# Patient Record
Sex: FEMALE | Race: OTHER | HISPANIC OR LATINO
[De-identification: names, ages, dates, MRNs, and addresses within clinical notes are randomized per-mention and may not be internally consistent; named-entity substitution may affect disease eponyms.]

---

## 2019-03-19 PROBLEM — Z00.00 ENCOUNTER FOR PREVENTIVE HEALTH EXAMINATION: Status: ACTIVE | Noted: 2019-03-19

## 2019-04-16 ENCOUNTER — APPOINTMENT (OUTPATIENT)
Dept: OTOLARYNGOLOGY | Facility: CLINIC | Age: 49
End: 2019-04-16

## 2019-05-14 ENCOUNTER — APPOINTMENT (OUTPATIENT)
Dept: OTOLARYNGOLOGY | Facility: CLINIC | Age: 49
End: 2019-05-14

## 2019-05-30 ENCOUNTER — APPOINTMENT (OUTPATIENT)
Dept: OTOLARYNGOLOGY | Facility: CLINIC | Age: 49
End: 2019-05-30

## 2023-05-02 ENCOUNTER — OFFICE (OUTPATIENT)
Facility: LOCATION | Age: 53
Setting detail: OPHTHALMOLOGY
End: 2023-05-02
Payer: COMMERCIAL

## 2023-05-02 ENCOUNTER — RX ONLY (RX ONLY)
Age: 53
End: 2023-05-02

## 2023-05-02 DIAGNOSIS — H10.45: ICD-10-CM

## 2023-05-02 PROCEDURE — 92002 INTRM OPH EXAM NEW PATIENT: CPT | Performed by: OPHTHALMOLOGY

## 2023-05-02 ASSESSMENT — SUPERFICIAL PUNCTATE KERATITIS (SPK)
OD_SPK: 3+
OS_SPK: 3+

## 2023-05-02 ASSESSMENT — TONOMETRY
OS_IOP_MMHG: 14
OD_IOP_MMHG: 14

## 2023-05-02 ASSESSMENT — VISUAL ACUITY
OS_BCVA: 20/40+2
OD_BCVA: 20/25+2

## 2023-05-02 ASSESSMENT — CONFRONTATIONAL VISUAL FIELD TEST (CVF)
OD_FINDINGS: FULL
OS_FINDINGS: FULL

## 2023-05-02 ASSESSMENT — PUNCTA - ASSESSMENT
OD_PUNCTA: RUL
OS_PUNCTA: LUL

## 2023-05-30 ENCOUNTER — RX ONLY (RX ONLY)
Age: 53
End: 2023-05-30

## 2023-05-30 ENCOUNTER — OFFICE (OUTPATIENT)
Facility: LOCATION | Age: 53
Setting detail: OPHTHALMOLOGY
End: 2023-05-30
Payer: COMMERCIAL

## 2023-05-30 DIAGNOSIS — H02.89: ICD-10-CM

## 2023-05-30 PROBLEM — H10.45 ALLERGIC CONJUNCTIVITIS ; BOTH EYES: Status: ACTIVE | Noted: 2023-05-02

## 2023-05-30 PROBLEM — H16.223 DRY EYE SYNDROME K SICCA; BOTH EYES: Status: ACTIVE | Noted: 2023-05-02

## 2023-05-30 PROCEDURE — 92012 INTRM OPH EXAM EST PATIENT: CPT | Performed by: OPHTHALMOLOGY

## 2023-05-30 ASSESSMENT — SPHEQUIV_DERIVED
OS_SPHEQUIV: -1.125
OD_SPHEQUIV: -1.5

## 2023-05-30 ASSESSMENT — REFRACTION_AUTOREFRACTION
OS_SPHERE: -0.25
OD_SPHERE: -1.25
OD_AXIS: 098
OD_CYLINDER: -0.50
OS_CYLINDER: -1.75
OS_AXIS: 052

## 2023-05-30 ASSESSMENT — LID EXAM ASSESSMENTS
OD_MEIBOMITIS: RLL 2+
OS_MEIBOMITIS: LLL 2+

## 2023-05-30 ASSESSMENT — KERATOMETRY
OD_K2POWER_DIOPTERS: 42.25
OS_K1POWER_DIOPTERS: 40.50
OS_AXISANGLE_DEGREES: 122
OS_K2POWER_DIOPTERS: 42.00
OD_AXISANGLE_DEGREES: 068
OD_K1POWER_DIOPTERS: 41.50

## 2023-05-30 ASSESSMENT — CONFRONTATIONAL VISUAL FIELD TEST (CVF)
OS_FINDINGS: FULL
OD_FINDINGS: FULL

## 2023-05-30 ASSESSMENT — VISUAL ACUITY
OS_BCVA: 20/40+2
OD_BCVA: 20/25+2

## 2023-05-30 ASSESSMENT — PUNCTA - ASSESSMENT
OS_PUNCTA: LUL
OD_PUNCTA: RUL

## 2023-05-30 ASSESSMENT — SUPERFICIAL PUNCTATE KERATITIS (SPK)
OS_SPK: 3+
OD_SPK: 3+

## 2023-05-30 ASSESSMENT — AXIALLENGTH_DERIVED
OD_AL: 24.8355
OS_AL: 24.9283

## 2023-06-20 ENCOUNTER — OFFICE (OUTPATIENT)
Facility: LOCATION | Age: 53
Setting detail: OPHTHALMOLOGY
End: 2023-06-20
Payer: COMMERCIAL

## 2023-06-20 ENCOUNTER — RX ONLY (RX ONLY)
Age: 53
End: 2023-06-20

## 2023-06-20 DIAGNOSIS — H02.89: ICD-10-CM

## 2023-06-20 PROCEDURE — 92012 INTRM OPH EXAM EST PATIENT: CPT | Performed by: OPHTHALMOLOGY

## 2023-06-20 ASSESSMENT — PUNCTA - ASSESSMENT
OD_PUNCTA: RUL
OS_PUNCTA: LUL

## 2023-06-20 ASSESSMENT — KERATOMETRY
OD_K1POWER_DIOPTERS: 41.50
OS_K2POWER_DIOPTERS: 42.00
OD_K2POWER_DIOPTERS: 42.25
OS_K1POWER_DIOPTERS: 40.50
OS_AXISANGLE_DEGREES: 122
OD_AXISANGLE_DEGREES: 068

## 2023-06-20 ASSESSMENT — REFRACTION_AUTOREFRACTION
OS_AXIS: 052
OS_SPHERE: -0.25
OD_AXIS: 098
OD_SPHERE: -1.25
OD_CYLINDER: -0.50
OS_CYLINDER: -1.75

## 2023-06-20 ASSESSMENT — VISUAL ACUITY
OS_BCVA: 20/40+2
OD_BCVA: 20/25+2

## 2023-06-20 ASSESSMENT — TONOMETRY
OS_IOP_MMHG: 10
OD_IOP_MMHG: 10

## 2023-06-20 ASSESSMENT — SUPERFICIAL PUNCTATE KERATITIS (SPK)
OD_SPK: 3+
OS_SPK: 3+

## 2023-06-20 ASSESSMENT — CONFRONTATIONAL VISUAL FIELD TEST (CVF)
OD_FINDINGS: FULL
OS_FINDINGS: FULL

## 2023-06-20 ASSESSMENT — LID EXAM ASSESSMENTS
OD_MEIBOMITIS: RLL 2+
OS_MEIBOMITIS: LLL 2+

## 2023-06-20 ASSESSMENT — SPHEQUIV_DERIVED
OD_SPHEQUIV: -1.5
OS_SPHEQUIV: -1.125

## 2023-06-20 ASSESSMENT — AXIALLENGTH_DERIVED
OD_AL: 24.8355
OS_AL: 24.9283

## 2023-08-25 ENCOUNTER — OFFICE (OUTPATIENT)
Facility: LOCATION | Age: 53
Setting detail: OPHTHALMOLOGY
End: 2023-08-25
Payer: COMMERCIAL

## 2023-08-25 DIAGNOSIS — H02.89: ICD-10-CM

## 2023-08-25 DIAGNOSIS — H52.13: ICD-10-CM

## 2023-08-25 PROBLEM — I67.1 CEREBRAL ANEURYSM,NONRUPTURED: Status: ACTIVE | Noted: 2023-08-25

## 2023-08-25 PROBLEM — H10.43 ALLERGIC CONJUNCTIVITIS ; BOTH EYES: Status: ACTIVE | Noted: 2023-08-25

## 2023-08-25 PROCEDURE — 99213 OFFICE O/P EST LOW 20 MIN: CPT | Performed by: OPHTHALMOLOGY

## 2023-08-25 PROCEDURE — 92015 DETERMINE REFRACTIVE STATE: CPT | Performed by: OPHTHALMOLOGY

## 2023-08-25 ASSESSMENT — KERATOMETRY
OS_K2POWER_DIOPTERS: 41.50
OS_AXISANGLE_DEGREES: 092
OD_AXISANGLE_DEGREES: 077
OD_K2POWER_DIOPTERS: 42.25
OD_K1POWER_DIOPTERS: 41.75
OS_K1POWER_DIOPTERS: 40.50

## 2023-08-25 ASSESSMENT — REFRACTION_AUTOREFRACTION
OD_SPHERE: -0.75
OS_AXIS: 074
OD_CYLINDER: -0.75
OD_AXIS: 089
OS_SPHERE: -0.75
OS_CYLINDER: -0.75

## 2023-08-25 ASSESSMENT — SUPERFICIAL PUNCTATE KERATITIS (SPK)
OS_SPK: 3+
OD_SPK: 3+

## 2023-08-25 ASSESSMENT — SPHEQUIV_DERIVED
OD_SPHEQUIV: -1.125
OS_SPHEQUIV: -1.375
OS_SPHEQUIV: -1.125
OD_SPHEQUIV: -1.375

## 2023-08-25 ASSESSMENT — LID EXAM ASSESSMENTS
OS_MEIBOMITIS: LLL 2+
OD_MEIBOMITIS: RLL 2+

## 2023-08-25 ASSESSMENT — AXIALLENGTH_DERIVED
OD_AL: 24.6245
OS_AL: 25.0312
OS_AL: 25.1414
OD_AL: 24.731

## 2023-08-25 ASSESSMENT — PUNCTA - ASSESSMENT
OD_PUNCTA: RUL
OS_PUNCTA: LUL

## 2023-08-25 ASSESSMENT — TONOMETRY
OD_IOP_MMHG: 10
OS_IOP_MMHG: 10

## 2023-08-25 ASSESSMENT — VISUAL ACUITY
OS_BCVA: 20/50-
OD_BCVA: 20/50

## 2023-08-25 ASSESSMENT — REFRACTION_MANIFEST
OS_CYLINDER: -0.75
OD_SPHERE: -1.00
OS_SPHERE: -1.00
OD_AXIS: 090
OD_CYLINDER: -0.75
OS_AXIS: 075

## 2023-10-02 ENCOUNTER — OFFICE (OUTPATIENT)
Facility: LOCATION | Age: 53
Setting detail: OPHTHALMOLOGY
End: 2023-10-02
Payer: COMMERCIAL

## 2023-10-02 DIAGNOSIS — H02.89: ICD-10-CM

## 2023-10-02 DIAGNOSIS — S05.02XA: ICD-10-CM

## 2023-10-02 PROCEDURE — 92012 INTRM OPH EXAM EST PATIENT: CPT | Performed by: OPHTHALMOLOGY

## 2023-10-02 ASSESSMENT — REFRACTION_MANIFEST
OS_CYLINDER: -0.75
OD_SPHERE: -1.00
OD_CYLINDER: -0.75
OD_AXIS: 090
OS_SPHERE: -1.00
OS_AXIS: 075

## 2023-10-02 ASSESSMENT — SUPERFICIAL PUNCTATE KERATITIS (SPK)
OD_SPK: 3+
OS_SPK: 3+

## 2023-10-02 ASSESSMENT — AXIALLENGTH_DERIVED
OS_AL: 25.1414
OS_AL: 25.0312
OD_AL: 24.731
OD_AL: 24.6245

## 2023-10-02 ASSESSMENT — KERATOMETRY
OS_K1POWER_DIOPTERS: 40.50
OS_AXISANGLE_DEGREES: 092
OD_K2POWER_DIOPTERS: 42.25
OS_K2POWER_DIOPTERS: 41.50
OD_AXISANGLE_DEGREES: 077
OD_K1POWER_DIOPTERS: 41.75

## 2023-10-02 ASSESSMENT — LID EXAM ASSESSMENTS
OS_EDEMA: LLL 1+
OS_MEIBOMITIS: LLL 2+
OD_MEIBOMITIS: RLL 2+

## 2023-10-02 ASSESSMENT — REFRACTION_AUTOREFRACTION
OS_SPHERE: -0.75
OS_AXIS: 074
OD_AXIS: 089
OD_CYLINDER: -0.75
OS_CYLINDER: -0.75
OD_SPHERE: -0.75

## 2023-10-02 ASSESSMENT — SPHEQUIV_DERIVED
OS_SPHEQUIV: -1.125
OD_SPHEQUIV: -1.375
OS_SPHEQUIV: -1.375
OD_SPHEQUIV: -1.125

## 2023-10-02 ASSESSMENT — PUNCTA - ASSESSMENT
OD_PUNCTA: RUL
OS_PUNCTA: LUL

## 2023-10-02 ASSESSMENT — VISUAL ACUITY: OD_BCVA: 20/25+2

## 2023-10-02 ASSESSMENT — CORNEAL TRAUMA - ABRASION: OS_ABRASION: PRESENT

## 2023-10-02 ASSESSMENT — CONFRONTATIONAL VISUAL FIELD TEST (CVF)
OS_FINDINGS: FULL
OD_FINDINGS: FULL

## 2023-10-11 ENCOUNTER — OFFICE (OUTPATIENT)
Facility: LOCATION | Age: 53
Setting detail: OPHTHALMOLOGY
End: 2023-10-11
Payer: COMMERCIAL

## 2023-10-11 DIAGNOSIS — H02.89: ICD-10-CM

## 2023-10-11 PROBLEM — S05.02XD CORNEAL ABRASION; SUBSEQUENT ENCOUNTER: Status: RESOLVED | Noted: 2023-10-11 | Resolved: 2023-10-11

## 2023-10-11 PROCEDURE — 92012 INTRM OPH EXAM EST PATIENT: CPT | Performed by: OPHTHALMOLOGY

## 2023-10-11 ASSESSMENT — PUNCTA - ASSESSMENT
OS_PUNCTA: LUL
OD_PUNCTA: RUL

## 2023-10-11 ASSESSMENT — REFRACTION_AUTOREFRACTION
OD_AXIS: 089
OS_AXIS: 074
OD_SPHERE: -0.75
OD_CYLINDER: -0.75
OS_SPHERE: -0.75
OS_CYLINDER: -0.75

## 2023-10-11 ASSESSMENT — AXIALLENGTH_DERIVED
OD_AL: 24.6245
OS_AL: 25.1414
OS_AL: 25.0312
OD_AL: 24.731

## 2023-10-11 ASSESSMENT — CONFRONTATIONAL VISUAL FIELD TEST (CVF)
OS_FINDINGS: FULL
OD_FINDINGS: FULL

## 2023-10-11 ASSESSMENT — SUPERFICIAL PUNCTATE KERATITIS (SPK)
OD_SPK: 3+
OS_SPK: 3+

## 2023-10-11 ASSESSMENT — KERATOMETRY
OD_K1POWER_DIOPTERS: 41.75
OS_K1POWER_DIOPTERS: 40.50
OS_AXISANGLE_DEGREES: 092
OD_K2POWER_DIOPTERS: 42.25
OS_K2POWER_DIOPTERS: 41.50
OD_AXISANGLE_DEGREES: 077

## 2023-10-11 ASSESSMENT — REFRACTION_MANIFEST
OS_AXIS: 075
OS_CYLINDER: -0.75
OS_SPHERE: -1.00
OD_AXIS: 090
OD_SPHERE: -1.00
OD_CYLINDER: -0.75

## 2023-10-11 ASSESSMENT — VISUAL ACUITY: OD_BCVA: 20/25+2

## 2023-10-11 ASSESSMENT — SPHEQUIV_DERIVED
OD_SPHEQUIV: -1.375
OD_SPHEQUIV: -1.125
OS_SPHEQUIV: -1.375
OS_SPHEQUIV: -1.125

## 2023-10-11 ASSESSMENT — LID EXAM ASSESSMENTS
OD_MEIBOMITIS: RLL 2+
OS_MEIBOMITIS: LLL 2+
OS_EDEMA: LLL 1+

## 2023-10-11 ASSESSMENT — CORNEAL TRAUMA - ABRASION: OS_ABRASION: ABSENT

## 2023-11-09 ENCOUNTER — APPOINTMENT (OUTPATIENT)
Dept: NEUROSURGERY | Facility: CLINIC | Age: 53
End: 2023-11-09
Payer: COMMERCIAL

## 2023-11-09 ENCOUNTER — NON-APPOINTMENT (OUTPATIENT)
Age: 53
End: 2023-11-09

## 2023-11-09 ENCOUNTER — TRANSCRIPTION ENCOUNTER (OUTPATIENT)
Age: 53
End: 2023-11-09

## 2023-11-09 VITALS — BODY MASS INDEX: 18.88 KG/M2 | WEIGHT: 100 LBS | HEIGHT: 61 IN

## 2023-11-09 DIAGNOSIS — H33.311 HORSESHOE TEAR OF RETINA W/OUT DETACHMENT, RIGHT EYE: ICD-10-CM

## 2023-11-09 DIAGNOSIS — Z86.69 PERSONAL HISTORY OF OTHER DISEASES OF THE NERVOUS SYSTEM AND SENSE ORGANS: ICD-10-CM

## 2023-11-09 DIAGNOSIS — Z78.9 OTHER SPECIFIED HEALTH STATUS: ICD-10-CM

## 2023-11-09 DIAGNOSIS — I67.1 CEREBRAL ANEURYSM, NONRUPTURED: ICD-10-CM

## 2023-11-09 PROCEDURE — 99204 OFFICE O/P NEW MOD 45 MIN: CPT

## 2023-11-09 RX ORDER — TICAGRELOR 90 MG/1
90 TABLET ORAL
Refills: 0 | Status: ACTIVE | COMMUNITY

## 2023-11-13 ENCOUNTER — TRANSCRIPTION ENCOUNTER (OUTPATIENT)
Age: 53
End: 2023-11-13

## 2023-11-14 ENCOUNTER — LABORATORY RESULT (OUTPATIENT)
Age: 53
End: 2023-11-14

## 2023-11-15 LAB — PA ADP PRP-ACNC: 128 PRU

## 2023-11-28 ENCOUNTER — OFFICE (OUTPATIENT)
Facility: LOCATION | Age: 53
Setting detail: OPHTHALMOLOGY
End: 2023-11-28
Payer: COMMERCIAL

## 2023-11-28 ENCOUNTER — RX ONLY (RX ONLY)
Age: 53
End: 2023-11-28

## 2023-11-28 DIAGNOSIS — H02.89: ICD-10-CM

## 2023-11-28 PROCEDURE — 92012 INTRM OPH EXAM EST PATIENT: CPT | Performed by: OPHTHALMOLOGY

## 2023-11-28 ASSESSMENT — REFRACTION_AUTOREFRACTION
OD_CYLINDER: -0.75
OS_AXIS: 074
OD_SPHERE: -0.75
OS_SPHERE: -0.75
OS_CYLINDER: -0.75
OD_AXIS: 089

## 2023-11-28 ASSESSMENT — REFRACTION_MANIFEST
OD_AXIS: 090
OD_CYLINDER: -0.75
OS_AXIS: 075
OS_CYLINDER: -0.75
OS_SPHERE: -1.00
OD_SPHERE: -1.00

## 2023-11-28 ASSESSMENT — SUPERFICIAL PUNCTATE KERATITIS (SPK)
OD_SPK: T 1+
OS_SPK: T 1+

## 2023-11-28 ASSESSMENT — SPHEQUIV_DERIVED
OS_SPHEQUIV: -1.375
OD_SPHEQUIV: -1.375
OD_SPHEQUIV: -1.125
OS_SPHEQUIV: -1.125

## 2023-11-28 ASSESSMENT — PUNCTA - ASSESSMENT
OD_PUNCTA: RUL
OS_PUNCTA: LUL

## 2023-11-28 ASSESSMENT — CONFRONTATIONAL VISUAL FIELD TEST (CVF)
OD_FINDINGS: FULL
OS_FINDINGS: FULL

## 2023-11-28 ASSESSMENT — CORNEAL TRAUMA - ABRASION: OS_ABRASION: ABSENT

## 2023-11-28 ASSESSMENT — LID EXAM ASSESSMENTS
OS_MEIBOMITIS: LLL 2+
OD_MEIBOMITIS: RLL 2+

## 2024-01-16 ENCOUNTER — NON-APPOINTMENT (OUTPATIENT)
Age: 54
End: 2024-01-16

## 2024-01-17 ENCOUNTER — OUTPATIENT (OUTPATIENT)
Dept: OUTPATIENT SERVICES | Facility: HOSPITAL | Age: 54
LOS: 1 days | End: 2024-01-17
Payer: COMMERCIAL

## 2024-01-17 ENCOUNTER — APPOINTMENT (OUTPATIENT)
Dept: MRI IMAGING | Facility: HOSPITAL | Age: 54
End: 2024-01-17

## 2024-01-17 VITALS
HEIGHT: 61 IN | RESPIRATION RATE: 16 BRPM | WEIGHT: 100.53 LBS | DIASTOLIC BLOOD PRESSURE: 78 MMHG | OXYGEN SATURATION: 98 % | HEART RATE: 84 BPM | SYSTOLIC BLOOD PRESSURE: 110 MMHG | TEMPERATURE: 98 F

## 2024-01-17 DIAGNOSIS — I67.1 CEREBRAL ANEURYSM, NONRUPTURED: ICD-10-CM

## 2024-01-17 DIAGNOSIS — Z98.890 OTHER SPECIFIED POSTPROCEDURAL STATES: Chronic | ICD-10-CM

## 2024-01-17 DIAGNOSIS — I67.1 CEREBRAL ANEURYSM, NONRUPTURED: Chronic | ICD-10-CM

## 2024-01-17 DIAGNOSIS — Z01.818 ENCOUNTER FOR OTHER PREPROCEDURAL EXAMINATION: ICD-10-CM

## 2024-01-17 DIAGNOSIS — Z90.49 ACQUIRED ABSENCE OF OTHER SPECIFIED PARTS OF DIGESTIVE TRACT: Chronic | ICD-10-CM

## 2024-01-17 DIAGNOSIS — R93.89 ABNORMAL FINDINGS ON DIAGNOSTIC IMAGING OF OTHER SPECIFIED BODY STRUCTURES: Chronic | ICD-10-CM

## 2024-01-17 LAB
ANION GAP SERPL CALC-SCNC: 12 MMOL/L — SIGNIFICANT CHANGE UP (ref 5–17)
BLD GP AB SCN SERPL QL: NEGATIVE — SIGNIFICANT CHANGE UP
BUN SERPL-MCNC: 10 MG/DL — SIGNIFICANT CHANGE UP (ref 7–23)
CALCIUM SERPL-MCNC: 9.8 MG/DL — SIGNIFICANT CHANGE UP (ref 8.4–10.5)
CHLORIDE SERPL-SCNC: 98 MMOL/L — SIGNIFICANT CHANGE UP (ref 96–108)
CO2 SERPL-SCNC: 26 MMOL/L — SIGNIFICANT CHANGE UP (ref 22–31)
CREAT SERPL-MCNC: 0.52 MG/DL — SIGNIFICANT CHANGE UP (ref 0.5–1.3)
EGFR: 110 ML/MIN/1.73M2 — SIGNIFICANT CHANGE UP
GLUCOSE SERPL-MCNC: 97 MG/DL — SIGNIFICANT CHANGE UP (ref 70–99)
HCT VFR BLD CALC: 36.4 % — SIGNIFICANT CHANGE UP (ref 34.5–45)
HGB BLD-MCNC: 11.7 G/DL — SIGNIFICANT CHANGE UP (ref 11.5–15.5)
MCHC RBC-ENTMCNC: 25.8 PG — LOW (ref 27–34)
MCHC RBC-ENTMCNC: 32.1 GM/DL — SIGNIFICANT CHANGE UP (ref 32–36)
MCV RBC AUTO: 80.4 FL — SIGNIFICANT CHANGE UP (ref 80–100)
NRBC # BLD: 0 /100 WBCS — SIGNIFICANT CHANGE UP (ref 0–0)
PA ADP PRP-ACNC: 142 PRU — LOW (ref 194–417)
PLATELET # BLD AUTO: 216 K/UL — SIGNIFICANT CHANGE UP (ref 150–400)
PLATELET RESPONSE ASPIRIN RESULT: 473 ARU — SIGNIFICANT CHANGE UP
POTASSIUM SERPL-MCNC: 3.8 MMOL/L — SIGNIFICANT CHANGE UP (ref 3.5–5.3)
POTASSIUM SERPL-SCNC: 3.8 MMOL/L — SIGNIFICANT CHANGE UP (ref 3.5–5.3)
RBC # BLD: 4.53 M/UL — SIGNIFICANT CHANGE UP (ref 3.8–5.2)
RBC # FLD: 13.5 % — SIGNIFICANT CHANGE UP (ref 10.3–14.5)
RH IG SCN BLD-IMP: POSITIVE — SIGNIFICANT CHANGE UP
SODIUM SERPL-SCNC: 136 MMOL/L — SIGNIFICANT CHANGE UP (ref 135–145)
WBC # BLD: 5.42 K/UL — SIGNIFICANT CHANGE UP (ref 3.8–10.5)
WBC # FLD AUTO: 5.42 K/UL — SIGNIFICANT CHANGE UP (ref 3.8–10.5)

## 2024-01-17 PROCEDURE — 85027 COMPLETE CBC AUTOMATED: CPT

## 2024-01-17 PROCEDURE — 86850 RBC ANTIBODY SCREEN: CPT

## 2024-01-17 PROCEDURE — 70544 MR ANGIOGRAPHY HEAD W/O DYE: CPT | Mod: 26

## 2024-01-17 PROCEDURE — 70544 MR ANGIOGRAPHY HEAD W/O DYE: CPT

## 2024-01-17 PROCEDURE — G0463: CPT

## 2024-01-17 PROCEDURE — 86900 BLOOD TYPING SEROLOGIC ABO: CPT

## 2024-01-17 PROCEDURE — 80048 BASIC METABOLIC PNL TOTAL CA: CPT

## 2024-01-17 PROCEDURE — 85576 BLOOD PLATELET AGGREGATION: CPT

## 2024-01-17 PROCEDURE — 86901 BLOOD TYPING SEROLOGIC RH(D): CPT

## 2024-01-17 RX ORDER — TICAGRELOR 90 MG/1
90 TABLET ORAL
Qty: 60 | Refills: 1 | Status: ACTIVE | COMMUNITY
Start: 2023-11-14 | End: 1900-01-01

## 2024-01-17 NOTE — H&P PST ADULT - NSICDXPASTSURGICALHX_GEN_ALL_CORE_FT
PAST SURGICAL HISTORY:  Brain aneurysm     H/O endoscopy     H/O eye surgery     History of appendectomy

## 2024-01-17 NOTE — H&P PST ADULT - HISTORY OF PRESENT ILLNESS
52 YO F s/p pipeline stent embolization of a right ICA carotid cave aneurysm (5/22/23), presents to [facility] [email or web address] for DIAGNOSTIC CEREBRAL ANGIOGRAM 1/23/2024. Denies any palpitations, SOB, N/V, fever or chills.

## 2024-01-17 NOTE — H&P PST ADULT - HEMATOLOGY/LYMPHATICS
Onset: 4/21/23     Location / description: Pain to incision site, lesion looks split open, raw-redness, creme/tan discharge-denies bleeding  Precipitating Factors: Right labial biopsy  Pain Scale (1-10), 10 highest: 8/10  What improves- Aquafor  /worsens symptoms: Urination  Symptom specific medications: Tramadol, Tylenol disolvable powder 500mg taken 2 hours ago  LMP : Patient's last menstrual period was 04/01/2023 (exact date).   Are you pregnant or breast feeding: N/A  Recent visits (last 3-4 weeks) for same reason or recent surgery:  4/21/22 Right labial biopsy    PLAN:    Paged Provider    Patient/Caller agrees to follow recommendations.    Reason for Disposition  • Severe pain in the incision    Protocols used: POST-OP INCISION SYMPTOMS AND [id number]-CM       details…

## 2024-01-17 NOTE — H&P PST ADULT - NSANTHOSAYNRD_GEN_A_CORE
No. EMMIE screening performed.  STOP BANG Legend: 0-2 = LOW Risk; 3-4 = INTERMEDIATE Risk; 5-8 = HIGH Risk

## 2024-01-17 NOTE — H&P PST ADULT - PROBLEM SELECTOR PLAN 1
DIAGNOSTIC CEREBRAL ANGIOGRAM  Pre-op education provided - all questions answered   Chlorhex soap & instructions given  CBC, BMP, T&S, P2Y12, PRA sent in PST  Remain on Brilinta & asprin until DOS

## 2024-01-17 NOTE — H&P PST ADULT - NSICDXPASTMEDICALHX_GEN_ALL_CORE_FT
PAST MEDICAL HISTORY:  H/O carpal tunnel syndrome     H/O osteopenia     History of uveitis     Retinal tear of right eye

## 2024-01-23 ENCOUNTER — OUTPATIENT (OUTPATIENT)
Dept: OUTPATIENT SERVICES | Facility: HOSPITAL | Age: 54
LOS: 1 days | End: 2024-01-23
Payer: COMMERCIAL

## 2024-01-23 ENCOUNTER — APPOINTMENT (OUTPATIENT)
Dept: NEUROSURGERY | Facility: HOSPITAL | Age: 54
End: 2024-01-23

## 2024-01-23 ENCOUNTER — RESULT REVIEW (OUTPATIENT)
Age: 54
End: 2024-01-23

## 2024-01-23 ENCOUNTER — TRANSCRIPTION ENCOUNTER (OUTPATIENT)
Age: 54
End: 2024-01-23

## 2024-01-23 VITALS
HEART RATE: 81 BPM | RESPIRATION RATE: 20 BRPM | OXYGEN SATURATION: 100 % | SYSTOLIC BLOOD PRESSURE: 118 MMHG | DIASTOLIC BLOOD PRESSURE: 74 MMHG

## 2024-01-23 VITALS — HEIGHT: 61 IN | WEIGHT: 100.53 LBS

## 2024-01-23 DIAGNOSIS — Z90.49 ACQUIRED ABSENCE OF OTHER SPECIFIED PARTS OF DIGESTIVE TRACT: Chronic | ICD-10-CM

## 2024-01-23 DIAGNOSIS — I67.1 CEREBRAL ANEURYSM, NONRUPTURED: Chronic | ICD-10-CM

## 2024-01-23 DIAGNOSIS — Z98.890 OTHER SPECIFIED POSTPROCEDURAL STATES: Chronic | ICD-10-CM

## 2024-01-23 DIAGNOSIS — I67.1 CEREBRAL ANEURYSM, NONRUPTURED: ICD-10-CM

## 2024-01-23 PROCEDURE — C1887: CPT

## 2024-01-23 PROCEDURE — 36224 PLACE CATH CAROTD ART: CPT | Mod: RT

## 2024-01-23 PROCEDURE — 36224 PLACE CATH CAROTD ART: CPT

## 2024-01-23 PROCEDURE — C1769: CPT

## 2024-01-23 PROCEDURE — C1760: CPT

## 2024-01-23 PROCEDURE — C1894: CPT

## 2024-01-23 RX ORDER — TICAGRELOR 90 MG/1
1 TABLET ORAL
Refills: 0 | DISCHARGE

## 2024-01-23 RX ORDER — CYCLOBENZAPRINE HYDROCHLORIDE 10 MG/1
2.5 TABLET, FILM COATED ORAL
Refills: 0 | DISCHARGE

## 2024-01-23 RX ORDER — ASPIRIN/CALCIUM CARB/MAGNESIUM 324 MG
650 TABLET ORAL DAILY
Refills: 0 | Status: DISCONTINUED | OUTPATIENT
Start: 2024-01-23 | End: 2024-02-06

## 2024-01-23 RX ORDER — ASPIRIN/CALCIUM CARB/MAGNESIUM 324 MG
0 TABLET ORAL
Refills: 0 | DISCHARGE

## 2024-01-23 RX ORDER — SODIUM CHLORIDE 9 MG/ML
1000 INJECTION INTRAMUSCULAR; INTRAVENOUS; SUBCUTANEOUS
Refills: 0 | Status: DISCONTINUED | OUTPATIENT
Start: 2024-01-23 | End: 2024-02-06

## 2024-01-23 RX ADMIN — Medication 650 MILLIGRAM(S): at 10:01

## 2024-01-23 NOTE — CHART NOTE - NSCHARTNOTEFT_GEN_A_CORE
Interventional Neuro Radiology  Pre-Procedure Note PA-C    This is a 53 year old right hand dominant female            Allergies: No Known Allergies  PMHX: Retinal tear of right eye, uveitis, carpal tunnel syndrome, osteopenia  PSHX: appendectomy, eye surgery, endoscopy  Social History:   FAMILY HISTORY:  Current Medications:     Labs:                   Blood Bank:       Assessment/Plan:   This is a 53 year old right hand dominant female    Patient presents to Neuro-IR for selective cerebral angiography.   Procedure, goals, risks, benefits and alternatives were discussed with patient and patient's family. All questions were answered. Risks include but are not limited to stroke, vessel injury, hemorrhage, and or right groin hematoma. Patient demonstrates understanding of all risks involved with this procedure and wishes to continue. Appropriate consent was obtained from patient and consent is in the patient's chart. Interventional Neuro Radiology  Pre-Procedure Note PA-C    This is a 53 year old right hand dominant female with a right supraclinoid ar           Allergies: No Known Allergies  PMHX: Retinal tear of right eye, uveitis, carpal tunnel syndrome, osteopenia  PSHX: appendectomy, eye surgery, endoscopy  Social History:   FAMILY HISTORY:  Current Medications:     Labs:                   Blood Bank:       Assessment/Plan:   This is a 53 year old right hand dominant female    Patient presents to Neuro-IR for selective cerebral angiography.   Procedure, goals, risks, benefits and alternatives were discussed with patient and patient's family. All questions were answered. Risks include but are not limited to stroke, vessel injury, hemorrhage, and or right groin hematoma. Patient demonstrates understanding of all risks involved with this procedure and wishes to continue. Appropriate consent was obtained from patient and consent is in the patient's chart.

## 2024-01-23 NOTE — ASU DISCHARGE PLAN (ADULT/PEDIATRIC) - ASU DC SPECIAL INSTRUCTIONSFT
Please stop Brilinta   Please increase water intake for the next 48 hours Please stop Brilinta   Please increase water intake for the next 48 hours    Please take aspirin 325mg

## 2024-01-23 NOTE — ASU DISCHARGE PLAN (ADULT/PEDIATRIC) - NS MD DC FALL RISK RISK
For information on Fall & Injury Prevention, visit: [email or web address] OR  [email or web address] OR  https://www.cdc.gov/steadi/patient.html

## 2024-01-23 NOTE — ASU DISCHARGE PLAN (ADULT/PEDIATRIC) - NURSING INSTRUCTIONS
Please give aspirin 325mg daily Please give aspirin 325mg daily  Please feel free to contact us at [phone] if any problems arise. After 6PM, Monday through Friday, on weekends and on holidays, please call [phone] and ask for the radiology resident on call to be paged.

## 2024-01-23 NOTE — CHART NOTE - NSCHARTNOTEFT_GEN_A_CORE
Interventional Neuro- Radiology   Procedure Note LUKE    Procedure: Catheter Cerebral Angiogram   Pre- Procedure Diagnosis:  Post- Procedure Diagnosis:    : Dr Nathanael Thornton  Fellow:   Physician Assistant: Paula Brunner PA-C    Nurse:  Radiologic Tech:  Anesthesiologist:  Sheath:        I/Os: EBL less than 10cc  IV fluids:     cc  Urine output     cc    Contrast Visi             Vitals: BP         HR      Spo2     %          Preliminary Report:  Using a 5 Croatian short sheath to the right groin under MAC sedation via left vertebral artery, left internal carotid artery, left external carotid artery, right vertebral artery, right internal carotid artery, right external carotid artery a selective cerebral angiography was performed and demonstrated                       Official note to follow.  Patient tolerated procedure well, hemodynamically stable, no change in neurological status compared to baseline. Results discussed with neuro ICU team, patient and patient's family. Right groin sheath was removed, manual compression held to hemostasis for 20 minutes, no active bleeding, no hematoma, quick clot and safeguard balloon dressing applied at Interventional Neuro- Radiology   Procedure Note PA-C    Procedure: Catheter Cerebral Angiogram   Pre- Procedure Diagnosis: cerebral aneurysm s/post endovascular treatment    Post- Procedure Diagnosis: stable right cavernous supraclinoid artery aneurysm     : Dr Nathanael Thornton  Fellow:    Dr Navid Mehta   Physician Assistant: Paula Brunner PA-C    Nurse:                   Nadia James RN   Radiologic Tech:   Jose Ray LRT, Antoine Brink LRT  Anesthesiologist:  Dr Mikaela Day   Sheath:                 5 Romanian short sheath         I/Os: EBL less than 10cc  IV fluids: 150cc  Urine due to void  Contrast Visi  20cc           Vitals: /65         HR 83      Spo2 100 %          Preliminary Report:  Using a 5 Romanian short sheath to the right groin under MAC sedation via right internal carotid artery a selective cerebral angiography was performed and demonstrated a stable right cavernous artery aneurysm. Official note to follow.  Patient tolerated procedure well, hemodynamically stable, no change in neurological status compared to baseline. Results discussed with neuro ICU team, patient and patient's family. Right groin sheath was removed, manual compression held to hemostasis for 20 minutes, no active bleeding, no hematoma, quick clot and safeguard balloon dressing applied at 0915am. Discontinue Brilinta 90mg. Interventional Neuro- Radiology   Procedure Note PA-C    Procedure: Catheter Cerebral Angiogram   Pre- Procedure Diagnosis: cerebral aneurysm s/post endovascular treatment    Post- Procedure Diagnosis: stable right cavernous supraclinoid artery aneurysm     : Dr Nathanael Thornton  Fellow:    Dr Navid Mehta   Physician Assistant: Paula Brunner PA-C    Nurse:                   Nadia James RN   Radiologic Tech:   Jose Ray LRT, Antoine Brink LRT  Anesthesiologist:  Dr Mikaela Day   Sheath:                 5 Armenian short sheath         I/Os: EBL less than 10cc  IV fluids: 150cc  Urine due to void  Contrast Visi  20cc           Vitals: /65         HR 83      Spo2 100 %          Preliminary Report:  Using a 5 Armenian short sheath to the right groin under MAC sedation via right internal carotid artery a selective cerebral angiography was performed and demonstrated a stable right cavernous artery aneurysm. Official note to follow.  Patient tolerated procedure well, hemodynamically stable, no change in neurological status compared to baseline. Results discussed with neuro ICU team, patient and patient's family. Right groin sheath was removed, manual compression held to hemostasis for 20 minutes, no active bleeding, no hematoma, quick clot and safeguard balloon dressing applied at 0915am. Discontinue Brilinta 90mg. And take ASA 325mg daily.

## 2024-01-31 DIAGNOSIS — Z09 ENCOUNTER FOR FOLLOW-UP EXAMINATION AFTER COMPLETED TREATMENT FOR CONDITIONS OTHER THAN MALIGNANT NEOPLASM: ICD-10-CM

## 2024-01-31 DIAGNOSIS — I67.1 CEREBRAL ANEURYSM, NONRUPTURED: ICD-10-CM

## 2024-03-26 ENCOUNTER — OFFICE (OUTPATIENT)
Facility: LOCATION | Age: 54
Setting detail: OPHTHALMOLOGY
End: 2024-03-26
Payer: COMMERCIAL

## 2024-03-26 DIAGNOSIS — H02.89: ICD-10-CM

## 2024-03-26 DIAGNOSIS — H16.223: ICD-10-CM

## 2024-03-26 PROCEDURE — 83861 MICROFLUID ANALY TEARS: CPT | Performed by: OPHTHALMOLOGY

## 2024-03-26 PROCEDURE — 92014 COMPRE OPH EXAM EST PT 1/>: CPT | Performed by: OPHTHALMOLOGY

## 2024-03-26 ASSESSMENT — LID EXAM ASSESSMENTS
OS_MEIBOMITIS: LLL 2+
OD_MEIBOMITIS: RLL 2+

## 2024-04-08 ASSESSMENT — REFRACTION_MANIFEST
OD_AXIS: 090
OD_SPHERE: -1.00
OS_SPHERE: -1.00
OS_CYLINDER: -0.75
OD_CYLINDER: -0.75
OS_AXIS: 075

## 2024-04-08 ASSESSMENT — AXIALLENGTH_DERIVED
OD_AL: 24.731
OS_AL: 25.1414

## 2024-04-08 ASSESSMENT — SPHEQUIV_DERIVED
OS_SPHEQUIV: -1.375
OD_SPHEQUIV: -1.375

## 2024-04-08 ASSESSMENT — KERATOMETRY
OS_K2POWER_DIOPTERS: 41.50
OD_K1POWER_DIOPTERS: 41.75
OD_K2POWER_DIOPTERS: 42.25
OS_AXISANGLE_DEGREES: 092
OD_AXISANGLE_DEGREES: 077
OS_K1POWER_DIOPTERS: 40.50

## 2024-04-08 ASSESSMENT — REFRACTION_CURRENTRX
OD_VPRISM_DIRECTION: SV
OS_SPHERE: -0.25
OS_CYLINDER: -0.50
OS_VPRISM_DIRECTION: SV
OD_SPHERE: -0.75
OS_AXIS: 002
OS_OVR_VA: 20/
OD_OVR_VA: 20/
OD_CYLINDER: SPH

## 2024-05-04 ENCOUNTER — OFFICE (OUTPATIENT)
Facility: LOCATION | Age: 54
Setting detail: OPHTHALMOLOGY
End: 2024-05-04
Payer: COMMERCIAL

## 2024-05-04 DIAGNOSIS — H02.89: ICD-10-CM

## 2024-05-04 DIAGNOSIS — H16.223: ICD-10-CM

## 2024-05-04 PROCEDURE — 92285 EXTERNAL OCULAR PHOTOGRAPHY: CPT | Performed by: OPHTHALMOLOGY

## 2024-05-04 PROCEDURE — 99213 OFFICE O/P EST LOW 20 MIN: CPT | Performed by: OPHTHALMOLOGY

## 2024-05-04 ASSESSMENT — CONFRONTATIONAL VISUAL FIELD TEST (CVF)
OD_FINDINGS: FULL
OS_FINDINGS: FULL

## 2024-05-04 ASSESSMENT — LID EXAM ASSESSMENTS
OS_MEIBOMITIS: LLL 2+
OD_MEIBOMITIS: RLL 2+

## 2024-05-08 ENCOUNTER — OFFICE (OUTPATIENT)
Facility: LOCATION | Age: 54
Setting detail: OPHTHALMOLOGY
End: 2024-05-08

## 2024-05-08 DIAGNOSIS — H02.89: ICD-10-CM

## 2024-05-08 PROCEDURE — LIPIFLOW LIPIFLOW: Performed by: OPHTHALMOLOGY

## 2024-05-08 ASSESSMENT — LID EXAM ASSESSMENTS
OS_MEIBOMITIS: LLL 2+
OD_MEIBOMITIS: RLL 2+

## 2024-05-08 ASSESSMENT — CONFRONTATIONAL VISUAL FIELD TEST (CVF)
OS_FINDINGS: FULL
OD_FINDINGS: FULL

## 2024-05-11 PROBLEM — H33.311 HORSESHOE TEAR OF RETINA WITHOUT DETACHMENT, RIGHT EYE: Chronic | Status: ACTIVE | Noted: 2024-01-17

## 2024-05-11 PROBLEM — Z86.69 PERSONAL HISTORY OF OTHER DISEASES OF THE NERVOUS SYSTEM AND SENSE ORGANS: Chronic | Status: ACTIVE | Noted: 2024-01-17

## 2024-05-11 PROBLEM — Z87.39 PERSONAL HISTORY OF OTHER DISEASES OF THE MUSCULOSKELETAL SYSTEM AND CONNECTIVE TISSUE: Chronic | Status: ACTIVE | Noted: 2024-01-17

## 2024-05-30 ENCOUNTER — OFFICE (OUTPATIENT)
Facility: LOCATION | Age: 54
Setting detail: OPHTHALMOLOGY
End: 2024-05-30
Payer: COMMERCIAL

## 2024-05-30 DIAGNOSIS — H10.45: ICD-10-CM

## 2024-05-30 DIAGNOSIS — H02.89: ICD-10-CM

## 2024-05-30 DIAGNOSIS — H16.223: ICD-10-CM

## 2024-05-30 PROCEDURE — 99213 OFFICE O/P EST LOW 20 MIN: CPT | Performed by: OPHTHALMOLOGY

## 2024-05-30 ASSESSMENT — CONFRONTATIONAL VISUAL FIELD TEST (CVF)
OS_FINDINGS: FULL
OD_FINDINGS: FULL

## 2024-05-30 ASSESSMENT — LID EXAM ASSESSMENTS
OD_MEIBOMITIS: RLL 2+
OS_MEIBOMITIS: LLL 2+

## 2024-06-25 ENCOUNTER — OFFICE (OUTPATIENT)
Facility: LOCATION | Age: 54
Setting detail: OPHTHALMOLOGY
End: 2024-06-25
Payer: COMMERCIAL

## 2024-06-25 DIAGNOSIS — H10.45: ICD-10-CM

## 2024-06-25 DIAGNOSIS — H02.89: ICD-10-CM

## 2024-06-25 DIAGNOSIS — H16.223: ICD-10-CM

## 2024-06-25 PROCEDURE — 99213 OFFICE O/P EST LOW 20 MIN: CPT | Performed by: OPHTHALMOLOGY

## 2024-06-25 ASSESSMENT — LID EXAM ASSESSMENTS
OS_MEIBOMITIS: LLL 2+
OD_MEIBOMITIS: RLL 2+

## 2024-06-25 ASSESSMENT — CONFRONTATIONAL VISUAL FIELD TEST (CVF)
OS_FINDINGS: FULL
OD_FINDINGS: FULL

## 2024-06-26 ENCOUNTER — OUTPATIENT (OUTPATIENT)
Dept: OUTPATIENT SERVICES | Facility: HOSPITAL | Age: 54
LOS: 1 days | End: 2024-06-26
Payer: COMMERCIAL

## 2024-06-26 ENCOUNTER — APPOINTMENT (OUTPATIENT)
Dept: MRI IMAGING | Facility: CLINIC | Age: 54
End: 2024-06-26
Payer: COMMERCIAL

## 2024-06-26 DIAGNOSIS — Z98.890 OTHER SPECIFIED POSTPROCEDURAL STATES: Chronic | ICD-10-CM

## 2024-06-26 DIAGNOSIS — Z90.49 ACQUIRED ABSENCE OF OTHER SPECIFIED PARTS OF DIGESTIVE TRACT: Chronic | ICD-10-CM

## 2024-06-26 DIAGNOSIS — I67.1 CEREBRAL ANEURYSM, NONRUPTURED: ICD-10-CM

## 2024-06-26 DIAGNOSIS — I67.1 CEREBRAL ANEURYSM, NONRUPTURED: Chronic | ICD-10-CM

## 2024-06-26 PROCEDURE — 70544 MR ANGIOGRAPHY HEAD W/O DYE: CPT

## 2024-06-26 PROCEDURE — 70544 MR ANGIOGRAPHY HEAD W/O DYE: CPT | Mod: 26

## 2024-07-08 ENCOUNTER — RX ONLY (RX ONLY)
Age: 54
End: 2024-07-08

## 2024-07-08 ENCOUNTER — OFFICE (OUTPATIENT)
Dept: URBAN - METROPOLITAN AREA CLINIC 77 | Facility: CLINIC | Age: 54
Setting detail: OPHTHALMOLOGY
End: 2024-07-08
Payer: COMMERCIAL

## 2024-07-08 DIAGNOSIS — H44.23: ICD-10-CM

## 2024-07-08 DIAGNOSIS — H33.311: ICD-10-CM

## 2024-07-08 DIAGNOSIS — H53.40: ICD-10-CM

## 2024-07-08 DIAGNOSIS — H16.223: ICD-10-CM

## 2024-07-08 DIAGNOSIS — H10.45: ICD-10-CM

## 2024-07-08 DIAGNOSIS — H02.89: ICD-10-CM

## 2024-07-08 DIAGNOSIS — I67.1: ICD-10-CM

## 2024-07-08 PROCEDURE — 92250 FUNDUS PHOTOGRAPHY W/I&R: CPT | Performed by: OPHTHALMOLOGY

## 2024-07-08 PROCEDURE — 99214 OFFICE O/P EST MOD 30 MIN: CPT | Performed by: OPHTHALMOLOGY

## 2024-07-08 PROCEDURE — 92083 EXTENDED VISUAL FIELD XM: CPT | Performed by: OPHTHALMOLOGY

## 2024-07-08 ASSESSMENT — CONFRONTATIONAL VISUAL FIELD TEST (CVF)
OS_FINDINGS: FULL
OD_FINDINGS: FULL

## 2024-07-08 ASSESSMENT — LID EXAM ASSESSMENTS
OD_MEIBOMITIS: RLL 2+
OS_MEIBOMITIS: LLL 2+

## 2024-07-18 ENCOUNTER — APPOINTMENT (OUTPATIENT)
Dept: NEUROSURGERY | Facility: CLINIC | Age: 54
End: 2024-07-18

## 2024-07-18 PROCEDURE — 99441: CPT

## 2024-10-09 ENCOUNTER — OFFICE (OUTPATIENT)
Facility: LOCATION | Age: 54
Setting detail: OPHTHALMOLOGY
End: 2024-10-09
Payer: COMMERCIAL

## 2024-10-09 ENCOUNTER — RX ONLY (RX ONLY)
Age: 54
End: 2024-10-09

## 2024-10-09 DIAGNOSIS — H02.89: ICD-10-CM

## 2024-10-09 PROCEDURE — 92012 INTRM OPH EXAM EST PATIENT: CPT | Performed by: OPHTHALMOLOGY

## 2024-10-09 ASSESSMENT — VISUAL ACUITY
OS_BCVA: 20/25
OD_BCVA: 20/25

## 2024-10-09 ASSESSMENT — REFRACTION_CURRENTRX
OD_OVR_VA: 20/
OS_OVR_VA: 20/
OD_SPHERE: -0.75
OD_VPRISM_DIRECTION: SV
OS_CYLINDER: -0.50
OS_AXIS: 002
OS_SPHERE: -0.25
OS_VPRISM_DIRECTION: SV
OD_CYLINDER: SPH

## 2024-10-09 ASSESSMENT — REFRACTION_AUTOREFRACTION
OD_SPHERE: -1.25
OD_CYLINDER: SPHERE
OS_AXIS: 65
OS_SPHERE: -1.00
OS_CYLINDER: -0.50

## 2024-10-09 ASSESSMENT — KERATOMETRY
OD_K1POWER_DIOPTERS: 41.25
METHOD_AUTO_MANUAL: AUTO
OS_K1POWER_DIOPTERS: 41.00
OD_AXISANGLE_DEGREES: 075
OS_AXISANGLE_DEGREES: 100
OS_K2POWER_DIOPTERS: 42.00
OD_K2POWER_DIOPTERS: 42.50

## 2024-10-09 ASSESSMENT — REFRACTION_MANIFEST
OS_CYLINDER: -0.75
OD_CYLINDER: -0.75
OS_AXIS: 075
OD_AXIS: 090
OD_SPHERE: -1.00
OS_SPHERE: -1.00

## 2024-10-09 ASSESSMENT — LID EXAM ASSESSMENTS
OD_MEIBOMITIS: RLL 2+
OS_MEIBOMITIS: LLL 2+

## 2024-10-09 ASSESSMENT — CONFRONTATIONAL VISUAL FIELD TEST (CVF)
OD_FINDINGS: FULL
OS_FINDINGS: FULL

## 2024-10-09 ASSESSMENT — CORNEAL TRAUMA - ABRASION: OS_ABRASION: ABSENT

## 2024-10-09 ASSESSMENT — SUPERFICIAL PUNCTATE KERATITIS (SPK)
OD_SPK: 2+
OS_SPK: 1+

## 2024-10-24 ENCOUNTER — APPOINTMENT (OUTPATIENT)
Dept: NEUROSURGERY | Facility: CLINIC | Age: 54
End: 2024-10-24

## 2024-11-19 ENCOUNTER — APPOINTMENT (OUTPATIENT)
Dept: NEUROSURGERY | Facility: CLINIC | Age: 54
End: 2024-11-19
Payer: COMMERCIAL

## 2024-11-19 VITALS
HEIGHT: 61 IN | WEIGHT: 108 LBS | BODY MASS INDEX: 20.39 KG/M2 | HEART RATE: 82 BPM | OXYGEN SATURATION: 97 % | DIASTOLIC BLOOD PRESSURE: 82 MMHG | SYSTOLIC BLOOD PRESSURE: 121 MMHG

## 2024-11-19 PROCEDURE — 99202 OFFICE O/P NEW SF 15 MIN: CPT

## 2024-11-19 RX ORDER — ASPIRIN ENTERIC COATED TABLETS 81 MG 81 MG/1
81 TABLET, DELAYED RELEASE ORAL
Qty: 90 | Refills: 3 | Status: ACTIVE | COMMUNITY
Start: 2024-11-19 | End: 1900-01-01

## 2024-11-21 ENCOUNTER — OFFICE (OUTPATIENT)
Facility: LOCATION | Age: 54
Setting detail: OPHTHALMOLOGY
End: 2024-11-21
Payer: COMMERCIAL

## 2024-11-21 ENCOUNTER — RX ONLY (RX ONLY)
Age: 54
End: 2024-11-21

## 2024-11-21 DIAGNOSIS — H00.11: ICD-10-CM

## 2024-11-21 PROCEDURE — 99213 OFFICE O/P EST LOW 20 MIN: CPT | Performed by: OPHTHALMOLOGY

## 2024-11-21 ASSESSMENT — REFRACTION_CURRENTRX
OD_SPHERE: -0.75
OS_CYLINDER: -0.50
OS_AXIS: 002
OD_VPRISM_DIRECTION: SV
OD_OVR_VA: 20/
OS_VPRISM_DIRECTION: SV
OD_CYLINDER: SPH
OS_SPHERE: -0.25
OS_OVR_VA: 20/

## 2024-11-21 ASSESSMENT — VISUAL ACUITY
OS_BCVA: 20/30-3
OD_BCVA: 20/20-2

## 2024-11-21 ASSESSMENT — CORNEAL TRAUMA - ABRASION: OS_ABRASION: ABSENT

## 2024-11-21 ASSESSMENT — REFRACTION_MANIFEST
OD_SPHERE: -1.00
OD_CYLINDER: -0.75
OD_AXIS: 090
OS_SPHERE: -1.00
OS_CYLINDER: -0.75
OS_AXIS: 075

## 2024-11-21 ASSESSMENT — KERATOMETRY
OD_K1POWER_DIOPTERS: 41.75
OS_K1POWER_DIOPTERS: 41.50
METHOD_AUTO_MANUAL: AUTO
OS_K2POWER_DIOPTERS: 42.25
OD_K2POWER_DIOPTERS: 42.50
OD_AXISANGLE_DEGREES: 069
OS_AXISANGLE_DEGREES: 107

## 2024-11-21 ASSESSMENT — REFRACTION_AUTOREFRACTION
OD_CYLINDER: -0.50
OD_AXIS: 108
OD_SPHERE: -1.00
OS_CYLINDER: -0.75
OS_AXIS: 047
OS_SPHERE: -1.00

## 2024-11-21 ASSESSMENT — LID EXAM ASSESSMENTS
OS_MEIBOMITIS: LLL 2+
OD_MEIBOMITIS: RLL 2+

## 2024-11-21 ASSESSMENT — SUPERFICIAL PUNCTATE KERATITIS (SPK)
OD_SPK: 2+
OS_SPK: 1+

## 2024-11-21 ASSESSMENT — CONFRONTATIONAL VISUAL FIELD TEST (CVF)
OS_FINDINGS: FULL
OD_FINDINGS: FULL

## 2025-01-07 ENCOUNTER — APPOINTMENT (OUTPATIENT)
Dept: NEUROSURGERY | Facility: HOSPITAL | Age: 55
End: 2025-01-07

## 2025-04-09 ENCOUNTER — RX ONLY (RX ONLY)
Age: 55
End: 2025-04-09

## 2025-04-09 ENCOUNTER — OFFICE (OUTPATIENT)
Facility: LOCATION | Age: 55
Setting detail: OPHTHALMOLOGY
End: 2025-04-09
Payer: COMMERCIAL

## 2025-04-09 DIAGNOSIS — H16.223: ICD-10-CM

## 2025-04-09 PROCEDURE — 92014 COMPRE OPH EXAM EST PT 1/>: CPT | Performed by: OPHTHALMOLOGY

## 2025-04-09 ASSESSMENT — REFRACTION_MANIFEST
OD_AXIS: 180
OS_SPHERE: -1.00
OS_CYLINDER: -0.75
OD_SPHERE: -0.75
OS_SPHERE: -0.25
OS_CYLINDER: -0.50
OS_AXIS: 075
OD_SPHERE: -1.00
OD_AXIS: 090
OS_AXIS: 170
OD_CYLINDER: 0.00
OD_CYLINDER: -0.75

## 2025-04-09 ASSESSMENT — VISUAL ACUITY
OD_BCVA: 20/20
OS_BCVA: 20/25+2

## 2025-04-09 ASSESSMENT — REFRACTION_CURRENTRX
OS_SPHERE: -0.25
OD_AXIS: 180
OD_OVR_VA: 20/
OS_AXIS: 166
OS_VPRISM_DIRECTION: SV
OD_SPHERE: -0.75
OS_CYLINDER: -0.50
OS_OVR_VA: 20/
OD_VPRISM_DIRECTION: SV
OD_CYLINDER: 0.00

## 2025-04-09 ASSESSMENT — KERATOMETRY
OS_K1POWER_DIOPTERS: 41.00
OS_AXISANGLE_DEGREES: 114
OD_K1POWER_DIOPTERS: 41.25
OS_K2POWER_DIOPTERS: 41.75
OD_K2POWER_DIOPTERS: 42.00
METHOD_AUTO_MANUAL: AUTO
OD_AXISANGLE_DEGREES: 063

## 2025-04-09 ASSESSMENT — REFRACTION_AUTOREFRACTION
OD_CYLINDER: -0.50
OD_AXIS: 009
OS_SPHERE: 0.00
OS_AXIS: 065
OS_CYLINDER: -1.00
OD_SPHERE: -0.50

## 2025-04-09 ASSESSMENT — SUPERFICIAL PUNCTATE KERATITIS (SPK)
OD_SPK: 2+
OS_SPK: 1+

## 2025-04-09 ASSESSMENT — CONFRONTATIONAL VISUAL FIELD TEST (CVF)
OD_FINDINGS: FULL
OS_FINDINGS: FULL

## 2025-04-09 ASSESSMENT — CORNEAL TRAUMA - ABRASION: OS_ABRASION: ABSENT

## 2025-04-09 ASSESSMENT — LID EXAM ASSESSMENTS
OD_MEIBOMITIS: RLL 2+
OS_MEIBOMITIS: LLL 2+

## 2025-04-23 ENCOUNTER — APPOINTMENT (OUTPATIENT)
Dept: MRI IMAGING | Facility: CLINIC | Age: 55
End: 2025-04-23

## 2025-05-14 ENCOUNTER — APPOINTMENT (OUTPATIENT)
Dept: MRI IMAGING | Facility: CLINIC | Age: 55
End: 2025-05-14
Payer: COMMERCIAL

## 2025-05-14 ENCOUNTER — OUTPATIENT (OUTPATIENT)
Dept: OUTPATIENT SERVICES | Facility: HOSPITAL | Age: 55
LOS: 1 days | End: 2025-05-14
Payer: COMMERCIAL

## 2025-05-14 ENCOUNTER — RESULT REVIEW (OUTPATIENT)
Age: 55
End: 2025-05-14

## 2025-05-14 DIAGNOSIS — Z98.890 OTHER SPECIFIED POSTPROCEDURAL STATES: Chronic | ICD-10-CM

## 2025-05-14 DIAGNOSIS — Z00.00 ENCOUNTER FOR GENERAL ADULT MEDICAL EXAMINATION WITHOUT ABNORMAL FINDINGS: ICD-10-CM

## 2025-05-14 DIAGNOSIS — Z90.49 ACQUIRED ABSENCE OF OTHER SPECIFIED PARTS OF DIGESTIVE TRACT: Chronic | ICD-10-CM

## 2025-05-14 DIAGNOSIS — I67.1 CEREBRAL ANEURYSM, NONRUPTURED: Chronic | ICD-10-CM

## 2025-05-14 PROCEDURE — 70548 MR ANGIOGRAPHY NECK W/DYE: CPT | Mod: 26

## 2025-05-14 PROCEDURE — A9585: CPT

## 2025-05-14 PROCEDURE — 70548 MR ANGIOGRAPHY NECK W/DYE: CPT

## 2025-05-14 PROCEDURE — 70546 MR ANGIOGRAPH HEAD W/O&W/DYE: CPT | Mod: 26

## 2025-05-14 PROCEDURE — 70546 MR ANGIOGRAPH HEAD W/O&W/DYE: CPT

## 2025-05-29 ENCOUNTER — APPOINTMENT (OUTPATIENT)
Dept: NEUROSURGERY | Facility: CLINIC | Age: 55
End: 2025-05-29
Payer: COMMERCIAL

## 2025-05-29 VITALS
HEART RATE: 78 BPM | WEIGHT: 108 LBS | SYSTOLIC BLOOD PRESSURE: 97 MMHG | HEIGHT: 61 IN | BODY MASS INDEX: 20.39 KG/M2 | RESPIRATION RATE: 16 BRPM | DIASTOLIC BLOOD PRESSURE: 64 MMHG | OXYGEN SATURATION: 98 %

## 2025-05-29 DIAGNOSIS — I67.1 CEREBRAL ANEURYSM, NONRUPTURED: ICD-10-CM

## 2025-05-29 PROCEDURE — 99215 OFFICE O/P EST HI 40 MIN: CPT

## 2025-07-15 ENCOUNTER — APPOINTMENT (OUTPATIENT)
Dept: NEUROSURGERY | Facility: CLINIC | Age: 55
End: 2025-07-15

## 2025-07-28 ENCOUNTER — RX ONLY (RX ONLY)
Age: 55
End: 2025-07-28

## 2025-07-28 ENCOUNTER — OFFICE (OUTPATIENT)
Facility: LOCATION | Age: 55
Setting detail: OPHTHALMOLOGY
End: 2025-07-28
Payer: COMMERCIAL

## 2025-07-28 DIAGNOSIS — H02.89: ICD-10-CM

## 2025-07-28 DIAGNOSIS — H10.45: ICD-10-CM

## 2025-07-28 DIAGNOSIS — H11.822: ICD-10-CM

## 2025-07-28 DIAGNOSIS — H16.223: ICD-10-CM

## 2025-07-28 PROCEDURE — 92012 INTRM OPH EXAM EST PATIENT: CPT | Performed by: OPHTHALMOLOGY

## 2025-07-28 ASSESSMENT — REFRACTION_CURRENTRX
OS_AXIS: 166
OS_VPRISM_DIRECTION: SV
OS_OVR_VA: 20/
OS_SPHERE: -0.25
OD_AXIS: 180
OS_CYLINDER: -0.50
OD_OVR_VA: 20/
OD_SPHERE: -0.75
OD_CYLINDER: 0.00
OD_VPRISM_DIRECTION: SV

## 2025-07-28 ASSESSMENT — KERATOMETRY
OS_AXISANGLE_DEGREES: 114
OS_K1POWER_DIOPTERS: 41.00
METHOD_AUTO_MANUAL: AUTO
OS_K2POWER_DIOPTERS: 41.75
OD_K2POWER_DIOPTERS: 42.00
OD_AXISANGLE_DEGREES: 063
OD_K1POWER_DIOPTERS: 41.25

## 2025-07-28 ASSESSMENT — REFRACTION_AUTOREFRACTION
OS_AXIS: 065
OD_AXIS: 009
OS_CYLINDER: -1.00
OD_CYLINDER: -0.50
OS_SPHERE: 0.00
OD_SPHERE: -0.50

## 2025-07-28 ASSESSMENT — REFRACTION_MANIFEST
OD_SPHERE: -0.75
OD_AXIS: 180
OS_CYLINDER: -0.50
OS_SPHERE: -1.00
OS_AXIS: 075
OD_AXIS: 090
OD_SPHERE: -1.00
OS_SPHERE: -0.25
OD_CYLINDER: 0.00
OD_CYLINDER: -0.75
OS_AXIS: 170
OS_CYLINDER: -0.75

## 2025-07-28 ASSESSMENT — VISUAL ACUITY
OD_BCVA: 20/40-2
OS_BCVA: 20/40-2

## 2025-07-28 ASSESSMENT — SUPERFICIAL PUNCTATE KERATITIS (SPK)
OS_SPK: 1+
OD_SPK: 2+

## 2025-07-28 ASSESSMENT — CORNEAL TRAUMA - ABRASION: OS_ABRASION: ABSENT

## 2025-07-28 ASSESSMENT — CONFRONTATIONAL VISUAL FIELD TEST (CVF)
OD_FINDINGS: FULL
OS_FINDINGS: FULL

## 2025-07-28 ASSESSMENT — LID EXAM ASSESSMENTS
OS_MEIBOMITIS: LLL 2+
OD_MEIBOMITIS: RLL 2+

## 2025-08-07 ENCOUNTER — APPOINTMENT (OUTPATIENT)
Dept: VASCULAR SURGERY | Facility: CLINIC | Age: 55
End: 2025-08-07